# Patient Record
Sex: FEMALE | Race: AMERICAN INDIAN OR ALASKA NATIVE
[De-identification: names, ages, dates, MRNs, and addresses within clinical notes are randomized per-mention and may not be internally consistent; named-entity substitution may affect disease eponyms.]

---

## 2020-01-05 ENCOUNTER — HOSPITAL ENCOUNTER (EMERGENCY)
Dept: HOSPITAL 5 - ED | Age: 30
Discharge: HOME | End: 2020-01-05
Payer: SELF-PAY

## 2020-01-05 VITALS — SYSTOLIC BLOOD PRESSURE: 115 MMHG | DIASTOLIC BLOOD PRESSURE: 72 MMHG

## 2020-01-05 DIAGNOSIS — Z91.048: ICD-10-CM
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
## 2020-01-05 DIAGNOSIS — S39.012A: Primary | ICD-10-CM

## 2020-01-05 DIAGNOSIS — Z88.8: ICD-10-CM

## 2020-01-05 DIAGNOSIS — Z91.040: ICD-10-CM

## 2020-01-05 DIAGNOSIS — F12.10: ICD-10-CM
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
## 2020-01-05 DIAGNOSIS — M54.41: ICD-10-CM

## 2020-01-05 DIAGNOSIS — Z79.899: ICD-10-CM

## 2020-01-05 PROCEDURE — 72100 X-RAY EXAM L-S SPINE 2/3 VWS: CPT
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
## 2022-08-26 ENCOUNTER — HOSPITAL ENCOUNTER (EMERGENCY)
Dept: HOSPITAL 5 - ED | Age: 32
LOS: 1 days | Discharge: HOME | End: 2022-08-27
Payer: COMMERCIAL

## 2022-08-26 DIAGNOSIS — Z91.09: ICD-10-CM

## 2022-08-26 DIAGNOSIS — Z91.040: ICD-10-CM

## 2022-08-26 DIAGNOSIS — N75.0: Primary | ICD-10-CM

## 2022-08-26 DIAGNOSIS — Z79.899: ICD-10-CM

## 2022-08-26 PROCEDURE — 99282 EMERGENCY DEPT VISIT SF MDM: CPT

## 2022-08-27 VITALS — DIASTOLIC BLOOD PRESSURE: 52 MMHG | SYSTOLIC BLOOD PRESSURE: 110 MMHG

## 2022-08-27 NOTE — EMERGENCY DEPARTMENT REPORT
- General


Chief complaint: Skin/Abscess/Foreign Body


Stated complaint: CYST VAGINA


Source: patient


Mode of arrival: Ambulatory


Limitations: No Limitations





- History of Present Illness


Initial comments: 


32-year-old female presents to the ED after having a Bartholin cyst drained.  

Patient states that her Dr. Beltran I&D x2 days ago.  Patient states that she 

did she did not receive any pain medication.  She denies any fever chills or 

nausea /vomiting at present time.  Patient denies any vaginal discharge dysuria.

 She states pain is a current 8 out of 10.  Patient is alert and oriented x3.  

No acute distress noted no ill appearance noted.





MD complaint: abscess/boil


Onset/Timin


-: days(s)


Location: genitals


Severity: moderate


Severity scale (0 -10): 6


Quality: aching


Consistency: intermittent


Improves with: none


Worsens with: none





- Related Data


                                  Previous Rx's











 Medication  Instructions  Recorded  Last Taken  Type


 


Acetaminophen/Codeine [Tylenol 1 tab PO Q6H PRN #12 tab 20 Unknown Rx





/Codeine # 3 tab]    


 


Ibuprofen [Motrin] 800 mg PO Q8HR PRN #24 tablet 20 Unknown Rx


 


predniSONE [Deltasone] 40 mg PO QDAY #10 tab 20 Unknown Rx


 


tiZANidine [Zanaflex 4mg TAB] 4 mg PO Q8H PRN #21 tablet 20 Unknown Rx


 


Acetaminophen/Codeine [Tylenol 1 tab PO Q6H PRN 3 Days #12 tab 22 Unknown 

Rx





/Codeine # 3 tab]    


 


Sulfamethoxazole/Trimethoprim 1 each PO BID 10 Days #20 tab 22 Unknown Rx





[Bactrim DS TAB]    











                                    Allergies











Allergy/AdvReac Type Severity Reaction Status Date / Time


 


Latex, Natural Rubber AdvReac  Angioedema Verified 20 00:40


 


tramadol AdvReac  Rash Verified 20 00:40














Abscess Boil HPI





- HPI


Chief Complaint: Skin/Abscess/Foreign Body


Stated Complaint: CYST VAGINA


Home Medications: 


                                  Previous Rx's











 Medication  Instructions  Recorded  Last Taken  Type


 


Acetaminophen/Codeine [Tylenol 1 tab PO Q6H PRN #12 tab 20 Unknown Rx





/Codeine # 3 tab]    


 


Ibuprofen [Motrin] 800 mg PO Q8HR PRN #24 tablet 20 Unknown Rx


 


predniSONE [Deltasone] 40 mg PO QDAY #10 tab 20 Unknown Rx


 


tiZANidine [Zanaflex 4mg TAB] 4 mg PO Q8H PRN #21 tablet 20 Unknown Rx


 


Acetaminophen/Codeine [Tylenol 1 tab PO Q6H PRN 3 Days #12 tab 22 Unknown 

Rx





/Codeine # 3 tab]    


 


Sulfamethoxazole/Trimethoprim 1 each PO BID 10 Days #20 tab 22 Unknown Rx





[Bactrim DS TAB]    











Allergies/Adverse Reactions: 


                                    Allergies











Allergy/AdvReac Type Severity Reaction Status Date / Time


 


Latex, Natural Rubber AdvReac  Angioedema Verified 20 00:40


 


tramadol AdvReac  Rash Verified 20 00:40














ED Review of Systems


ROS: 


Stated complaint: CYST VAGINA


Other details as noted in HPI





Constitutional: denies: chills, fever


Eyes: denies: eye pain, eye discharge, vision change


ENT: denies: ear pain, throat pain


Respiratory: denies: cough, shortness of breath, wheezing


Cardiovascular: denies: chest pain, palpitations


Endocrine: no symptoms reported


Gastrointestinal: denies: abdominal pain, nausea, diarrhea


Genitourinary: denies: urgency, dysuria, discharge


Musculoskeletal: denies: back pain, joint swelling, arthralgia


Skin: as per HPI, lesions.  denies: rash


Neurological: denies: headache, weakness, paresthesias


Psychiatric: denies: anxiety, depression


Hematological/Lymphatic: denies: easy bleeding, easy bruising





ED Past Medical Hx





- Past Medical History


Previous Medical History?: No





- Surgical History


Past Surgical History?: Yes


Additional Surgical History: cyst drainage





- Social History


Smoking Status: Never Smoker


Substance Use Type: Marijuana





- Medications


Home Medications: 


                                Home Medications











 Medication  Instructions  Recorded  Confirmed  Last Taken  Type


 


Acetaminophen/Codeine [Tylenol 1 tab PO Q6H PRN #12 tab 20  Unknown Rx





/Codeine # 3 tab]     


 


Ibuprofen [Motrin] 800 mg PO Q8HR PRN #24 tablet 20  Unknown Rx


 


predniSONE [Deltasone] 40 mg PO QDAY #10 tab 20  Unknown Rx


 


tiZANidine [Zanaflex 4mg TAB] 4 mg PO Q8H PRN #21 tablet 20  Unknown Rx


 


Acetaminophen/Codeine [Tylenol 1 tab PO Q6H PRN 3 Days #12 tab 22  Unknown

 Rx





/Codeine # 3 tab]     


 


Sulfamethoxazole/Trimethoprim 1 each PO BID 10 Days #20 tab 22  Unknown Rx





[Bactrim DS TAB]     














ED Physical Exam





- General


Limitations: No Limitations


General appearance: alert, in no apparent distress





- Head


Head exam: Present: atraumatic, normocephalic





- Eye


Eye exam: Present: normal appearance





- ENT


ENT exam: Present: mucous membranes moist





- Neck


Neck exam: Present: normal inspection





- Respiratory


Respiratory exam: Present: normal lung sounds bilaterally.  Absent: respiratory 

distress





- Cardiovascular


Cardiovascular Exam: Present: regular rate, normal rhythm.  Absent: systolic 

murmur, diastolic murmur, rubs, gallop





- GI/Abdominal


GI/Abdominal exam: Present: soft, normal bowel sounds





- Extremities Exam


Extremities exam: Present: normal inspection





- Back Exam


Back exam: Present: normal inspection





- Neurological Exam


Neurological exam: Present: alert, oriented X3





- Psychiatric


Psychiatric exam: Present: normal affect, normal mood





- Skin


Skin exam: Present: warm, dry, intact, normal color.  Absent: rash





ED Course





                                   Vital Signs











  22





  00:19


 


Temperature 98.5 F


 


Pulse Rate 83


 


Respiratory 20





Rate 


 


Blood Pressure 123/94





[Right] 


 


O2 Sat by Pulse 98





Oximetry 














ED Medical Decision Making





- Medical Decision Making


2-year-old female presents to the ED after having a Bartholin cyst drained.  

Patient states that her Dr. Beltran I&D x2 days ago.  Patient states that she 

did she did not receive any pain medication.  She denies any fever chills or 

nausea /vomiting at present time.  Patient denies any vaginal discharge dysuria.

  She states pain is a current 8 out of 10.  Patient is alert and oriented x3.  

No acute distress noted no ill appearance noted.  Physical examination drainage 

is noted from the right labia.  Patient had  and lidocaine 2% applied to a 

dressing with an occlusion.





Rechecked the patient is resting  quietly , comfortable and feeling better. I 

discussed the results of diagnostic study, my clinical impression and the plan 

for further treatment with the patient. Patient agrees with plan and discharge 

at this present time. All question addressed.





I have given the patient instruction regarding a diagnosis ,expectation ,follow-

up and return precaution. I explained to the patient that emergent condition may

 arise and to return to the ED for new worsen and any new persisting condition. 

I have explained the importance of following up with the primary care physician 

or referral physician listed below has instructed. The patient verbalized 

understanding of discharge instruction.











Critical care attestation.: 


If time is entered above; I have spent that time in minutes in the direct care 

of this critically ill patient, excluding procedure time.








ED Disposition


Clinical Impression: 


 Bartholin cyst





Disposition: 01 HOME / SELF CARE / HOMELESS


Is pt being admited?: No


Does the pt Need Aspirin: No


Condition: Stable


Instructions:  Bartholin's Cyst, Easy-to-Read


Additional Instructions: 


Continue to take Keflex as previous prescribed


Return to ED for any worsening symptom


Prescriptions: 


Sulfamethoxazole/Trimethoprim [Bactrim DS TAB] 1 each PO BID 10 Days #20 tab


Acetaminophen/Codeine [Tylenol /Codeine # 3 tab] 1 tab PO Q6H PRN 3 Days #12 tab


 PRN Reason: Pain, Mild (1-3)


Referrals: 


BERTA HE MD [Primary Care Provider] - 3-5 Days


Forms:  Work/School Release Form(ED)


Time of Disposition: 09:59